# Patient Record
Sex: FEMALE | Race: WHITE | Employment: UNEMPLOYED | URBAN - METROPOLITAN AREA
[De-identification: names, ages, dates, MRNs, and addresses within clinical notes are randomized per-mention and may not be internally consistent; named-entity substitution may affect disease eponyms.]

---

## 2017-02-09 ENCOUNTER — HOSPITAL ENCOUNTER (OUTPATIENT)
Dept: SLEEP MEDICINE | Age: 47
Discharge: HOME | End: 2017-02-09
Payer: COMMERCIAL

## 2017-02-09 PROCEDURE — 95810 POLYSOM 6/> YRS 4/> PARAM: CPT

## 2017-02-23 ENCOUNTER — HOSPITAL ENCOUNTER (OUTPATIENT)
Dept: SLEEP MEDICINE | Age: 47
Discharge: HOME | End: 2017-02-23
Payer: COMMERCIAL

## 2017-02-23 PROCEDURE — 95811 POLYSOM 6/>YRS CPAP 4/> PARM: CPT

## 2017-09-06 PROBLEM — Z99.89 OSA ON CPAP: Status: ACTIVE | Noted: 2017-09-06

## 2017-09-06 PROBLEM — G47.33 OSA ON CPAP: Status: ACTIVE | Noted: 2017-09-06

## 2017-09-23 PROBLEM — G47.33 OSA ON CPAP: Chronic | Status: ACTIVE | Noted: 2017-09-06

## 2017-09-23 PROBLEM — Z99.89 OSA ON CPAP: Chronic | Status: ACTIVE | Noted: 2017-09-06

## 2017-10-05 ENCOUNTER — HOSPITAL ENCOUNTER (OUTPATIENT)
Dept: MAMMOGRAPHY | Age: 47
Discharge: HOME | End: 2017-10-05
Attending: FAMILY MEDICINE
Payer: COMMERCIAL

## 2017-10-05 DIAGNOSIS — N64.4 BREAST PAIN, LEFT: ICD-10-CM

## 2017-10-05 PROCEDURE — 77066 DX MAMMO INCL CAD BI: CPT

## 2017-11-04 PROBLEM — E66.9 CLASS 2 OBESITY IN ADULT: Status: ACTIVE | Noted: 2017-11-04

## 2018-04-04 PROBLEM — Z91.199 NO-SHOW FOR APPOINTMENT: Chronic | Status: ACTIVE | Noted: 2018-04-04

## 2018-09-19 ENCOUNTER — HOSPITAL ENCOUNTER (OUTPATIENT)
Dept: GENERAL RADIOLOGY | Age: 48
Discharge: HOME | End: 2018-09-19
Attending: FAMILY MEDICINE
Payer: COMMERCIAL

## 2018-09-19 PROCEDURE — 72110 X-RAY EXAM L-2 SPINE 4/>VWS: CPT

## 2018-12-14 PROBLEM — N18.30 CHRONIC KIDNEY DISEASE, STAGE III (MODERATE) (HCC): Chronic | Status: ACTIVE | Noted: 2018-12-14

## 2018-12-14 PROBLEM — N18.30 CHRONIC KIDNEY DISEASE, STAGE III (MODERATE) (HCC): Status: ACTIVE | Noted: 2018-12-14

## 2020-01-13 PROBLEM — Z00.00 PREVENTATIVE HEALTH CARE: Status: ACTIVE | Noted: 2020-01-13

## 2020-01-13 PROBLEM — E66.01 SEVERE OBESITY (HCC): Status: ACTIVE | Noted: 2020-01-13

## 2020-01-13 PROBLEM — F33.1 DEPRESSION, MAJOR, RECURRENT, MODERATE (HCC): Status: ACTIVE | Noted: 2020-01-13

## 2020-01-28 PROBLEM — E83.52 HYPERCALCEMIA: Status: ACTIVE | Noted: 2020-01-28

## 2020-01-28 PROBLEM — R79.89 ABNORMAL LFTS: Status: ACTIVE | Noted: 2020-01-28

## 2020-02-12 PROBLEM — Z00.00 PREVENTATIVE HEALTH CARE: Status: RESOLVED | Noted: 2020-01-13 | Resolved: 2020-02-12

## 2020-02-25 LAB
HBA1C MFR BLD HPLC: 9.7 %
HBA1C MFR BLD HPLC: 9.7 %

## 2020-03-12 ENCOUNTER — HOSPITAL ENCOUNTER (OUTPATIENT)
Dept: ULTRASOUND IMAGING | Age: 50
Discharge: HOME | End: 2020-03-12
Attending: INTERNAL MEDICINE
Payer: COMMERCIAL

## 2020-03-12 DIAGNOSIS — R79.89 ELEVATED LFTS: ICD-10-CM

## 2020-03-12 PROCEDURE — 76700 US EXAM ABDOM COMPLETE: CPT

## 2021-03-04 PROBLEM — Z12.4 CERVICAL CANCER SCREENING: Status: ACTIVE | Noted: 2021-03-04

## 2021-03-04 PROBLEM — Z12.11 COLON CANCER SCREENING: Status: ACTIVE | Noted: 2021-03-04

## 2021-03-04 PROBLEM — Z12.39 BREAST CANCER SCREENING: Status: ACTIVE | Noted: 2021-03-04

## 2021-05-06 PROBLEM — E83.52 HYPERCALCEMIA: Status: RESOLVED | Noted: 2020-01-28 | Resolved: 2021-05-06

## 2021-05-06 PROBLEM — R80.9 MICROALBUMINURIA DUE TO TYPE 2 DIABETES MELLITUS (HCC): Status: ACTIVE | Noted: 2021-05-06

## 2021-05-06 PROBLEM — E11.29 MICROALBUMINURIA DUE TO TYPE 2 DIABETES MELLITUS (HCC): Status: ACTIVE | Noted: 2021-05-06

## 2021-08-04 PROBLEM — M75.81 RIGHT ROTATOR CUFF TENDINITIS: Status: ACTIVE | Noted: 2021-08-04

## 2021-11-05 PROBLEM — N18.4 CKD (CHRONIC KIDNEY DISEASE) STAGE 4, GFR 15-29 ML/MIN (HCC): Status: ACTIVE | Noted: 2018-12-14

## 2021-11-05 PROBLEM — Z91.199 NO-SHOW FOR APPOINTMENT: Chronic | Status: RESOLVED | Noted: 2018-04-04 | Resolved: 2021-11-05

## 2021-11-05 PROBLEM — D64.9 CHRONIC ANEMIA: Status: ACTIVE | Noted: 2021-11-05

## 2022-03-10 PROBLEM — Z23 ENCOUNTER FOR IMMUNIZATION: Status: ACTIVE | Noted: 2022-03-10

## 2022-03-18 PROBLEM — Z12.4 CERVICAL CANCER SCREENING: Status: ACTIVE | Noted: 2021-03-04

## 2022-03-18 PROBLEM — M75.81 RIGHT ROTATOR CUFF TENDINITIS: Status: ACTIVE | Noted: 2021-08-04

## 2022-03-18 PROBLEM — Z12.39 BREAST CANCER SCREENING: Status: ACTIVE | Noted: 2021-03-04

## 2022-03-19 PROBLEM — Z99.89 OSA ON CPAP: Status: ACTIVE | Noted: 2017-09-06

## 2022-03-19 PROBLEM — R80.9 MICROALBUMINURIA DUE TO TYPE 2 DIABETES MELLITUS (HCC): Status: ACTIVE | Noted: 2021-05-06

## 2022-03-19 PROBLEM — D64.9 CHRONIC ANEMIA: Status: ACTIVE | Noted: 2021-11-05

## 2022-03-19 PROBLEM — R79.89 ABNORMAL LFTS: Status: ACTIVE | Noted: 2020-01-28

## 2022-03-19 PROBLEM — Z23 ENCOUNTER FOR IMMUNIZATION: Status: ACTIVE | Noted: 2022-03-10

## 2022-03-19 PROBLEM — G47.33 OSA ON CPAP: Status: ACTIVE | Noted: 2017-09-06

## 2022-03-19 PROBLEM — Z12.11 COLON CANCER SCREENING: Status: ACTIVE | Noted: 2021-03-04

## 2022-03-19 PROBLEM — N18.4 CKD (CHRONIC KIDNEY DISEASE) STAGE 4, GFR 15-29 ML/MIN (HCC): Status: ACTIVE | Noted: 2018-12-14

## 2022-03-19 PROBLEM — E11.29 MICROALBUMINURIA DUE TO TYPE 2 DIABETES MELLITUS (HCC): Status: ACTIVE | Noted: 2021-05-06

## 2022-03-20 PROBLEM — F33.1 DEPRESSION, MAJOR, RECURRENT, MODERATE (HCC): Status: ACTIVE | Noted: 2020-01-13

## 2022-04-09 PROBLEM — Z12.39 BREAST CANCER SCREENING: Status: RESOLVED | Noted: 2021-03-04 | Resolved: 2022-04-09

## 2022-04-27 PROBLEM — Z12.11 COLON CANCER SCREENING: Status: RESOLVED | Noted: 2021-03-04 | Resolved: 2022-04-27

## 2022-04-28 DIAGNOSIS — Z79.4 ENCOUNTER FOR LONG-TERM (CURRENT) INSULIN USE (HCC): ICD-10-CM

## 2022-04-28 DIAGNOSIS — E11.29 MICROALBUMINURIA DUE TO TYPE 2 DIABETES MELLITUS (HCC): Primary | ICD-10-CM

## 2022-04-28 DIAGNOSIS — N18.4 CKD (CHRONIC KIDNEY DISEASE) STAGE 4, GFR 15-29 ML/MIN (HCC): ICD-10-CM

## 2022-04-28 DIAGNOSIS — E03.9 HYPOTHYROIDISM, UNSPECIFIED TYPE: ICD-10-CM

## 2022-04-28 DIAGNOSIS — R80.9 MICROALBUMINURIA DUE TO TYPE 2 DIABETES MELLITUS (HCC): Primary | ICD-10-CM

## 2022-05-05 ENCOUNTER — HOSPITAL ENCOUNTER (OUTPATIENT)
Dept: MAMMOGRAPHY | Age: 52
Discharge: HOME | End: 2022-05-05
Attending: INTERNAL MEDICINE
Payer: COMMERCIAL

## 2022-05-05 DIAGNOSIS — Z12.31 ENCOUNTER FOR SCREENING MAMMOGRAM FOR MALIGNANT NEOPLASM OF BREAST: ICD-10-CM

## 2022-05-05 PROCEDURE — 77067 SCR MAMMO BI INCL CAD: CPT

## 2022-05-23 DIAGNOSIS — E78.5 HYPERLIPIDEMIA, UNSPECIFIED: ICD-10-CM

## 2022-05-23 RX ORDER — AMLODIPINE BESYLATE 10 MG/1
10 TABLET ORAL DAILY
Qty: 30 TABLET | Refills: 5 | Status: SHIPPED | OUTPATIENT
Start: 2022-05-23 | End: 2022-05-24

## 2022-05-23 RX ORDER — ATORVASTATIN CALCIUM 80 MG/1
80 TABLET, FILM COATED ORAL DAILY
Qty: 30 TABLET | Refills: 5 | Status: SHIPPED | OUTPATIENT
Start: 2022-05-23 | End: 2022-05-24

## 2022-05-23 RX ORDER — METOPROLOL SUCCINATE 50 MG/1
50 TABLET, EXTENDED RELEASE ORAL DAILY
Qty: 30 TABLET | Refills: 5 | Status: SHIPPED | OUTPATIENT
Start: 2022-05-23 | End: 2022-05-24

## 2022-05-23 NOTE — TELEPHONE ENCOUNTER
Requested Prescriptions     Pending Prescriptions Disp Refills    amLODIPine (NORVASC) 10 MG tablet 30 tablet 5     Sig: Take 1 tablet by mouth daily    atorvastatin (LIPITOR) 80 MG tablet 30 tablet 5     Sig: Take 1 tablet by mouth daily    metoprolol succinate (TOPROL XL) 50 MG extended release tablet 30 tablet 5     Sig: Take 1 tablet by mouth daily     Last ofv  03/10/22

## 2022-05-24 RX ORDER — AMLODIPINE BESYLATE 10 MG/1
TABLET ORAL
Qty: 30 TABLET | Refills: 5 | Status: SHIPPED | OUTPATIENT
Start: 2022-05-24

## 2022-05-24 RX ORDER — ATORVASTATIN CALCIUM 80 MG/1
TABLET, FILM COATED ORAL
Qty: 30 TABLET | Refills: 5 | Status: SHIPPED | OUTPATIENT
Start: 2022-05-24

## 2022-05-24 RX ORDER — METOPROLOL SUCCINATE 50 MG/1
TABLET, EXTENDED RELEASE ORAL
Qty: 30 TABLET | Refills: 5 | Status: SHIPPED | OUTPATIENT
Start: 2022-05-24

## 2022-05-25 RX ORDER — INSULIN GLARGINE 100 [IU]/ML
45 INJECTION, SOLUTION SUBCUTANEOUS NIGHTLY
Qty: 5 PEN | Refills: 5 | Status: SHIPPED | OUTPATIENT
Start: 2022-05-25

## 2022-05-25 RX ORDER — GABAPENTIN 100 MG/1
100 CAPSULE ORAL 3 TIMES DAILY
Qty: 270 CAPSULE | Refills: 1 | Status: SHIPPED | OUTPATIENT
Start: 2022-05-25 | End: 2022-11-04

## 2022-05-25 NOTE — TELEPHONE ENCOUNTER
Requested Prescriptions     Pending Prescriptions Disp Refills    insulin glargine (LANTUS SOLOSTAR) 100 UNIT/ML injection pen 5 pen 5     Sig: Inject 45 Units into the skin nightly INJECT 45 UNITS BY SUBCUTANEOUS ROUTE DAILY. CALL MD IF BLOOD SUGAR IS LESS THAN 70 OR MORE THAN 400INJECT 45 UNITS BY SUBCUTANEOUS ROUTE DAILY.  CALL MD IF BLOOD SUGAR IS LESS THAN 70 OR MORE THAN 400

## 2022-06-22 RX ORDER — LEVOTHYROXINE SODIUM 112 UG/1
112 TABLET ORAL
Qty: 90 TABLET | Refills: 1 | Status: SHIPPED | OUTPATIENT
Start: 2022-06-22

## 2022-06-22 NOTE — TELEPHONE ENCOUNTER
Requested Prescriptions     Pending Prescriptions Disp Refills    levothyroxine (SYNTHROID) 112 MCG tablet 90 tablet 1     Sig: Take 1 tablet by mouth every morning (before breakfast)

## 2022-06-30 DIAGNOSIS — E11.65 TYPE 2 DIABETES MELLITUS WITH HYPERGLYCEMIA (HCC): ICD-10-CM

## 2022-06-30 RX ORDER — INSULIN GLARGINE 100 [IU]/ML
INJECTION, SOLUTION SUBCUTANEOUS
Refills: 5 | OUTPATIENT
Start: 2022-06-30

## 2022-07-11 DIAGNOSIS — N18.4 CKD (CHRONIC KIDNEY DISEASE) STAGE 4, GFR 15-29 ML/MIN (HCC): ICD-10-CM

## 2022-07-11 DIAGNOSIS — E11.29 MICROALBUMINURIA DUE TO TYPE 2 DIABETES MELLITUS (HCC): ICD-10-CM

## 2022-07-11 DIAGNOSIS — E03.9 HYPOTHYROIDISM, UNSPECIFIED TYPE: ICD-10-CM

## 2022-07-11 DIAGNOSIS — R80.9 MICROALBUMINURIA DUE TO TYPE 2 DIABETES MELLITUS (HCC): ICD-10-CM

## 2022-07-11 DIAGNOSIS — Z79.4 ENCOUNTER FOR LONG-TERM (CURRENT) INSULIN USE (HCC): ICD-10-CM

## 2022-07-11 LAB
ALBUMIN SERPL-MCNC: 3.4 G/DL (ref 3.5–5)
ALBUMIN/GLOB SERPL: 0.8 {RATIO} (ref 1.2–3.5)
ALP SERPL-CCNC: 117 U/L (ref 50–136)
ALT SERPL-CCNC: 34 U/L (ref 12–65)
ANION GAP SERPL CALC-SCNC: 5 MMOL/L (ref 7–16)
AST SERPL-CCNC: 22 U/L (ref 15–37)
BILIRUB SERPL-MCNC: 0.3 MG/DL (ref 0.2–1.1)
BUN SERPL-MCNC: 37 MG/DL (ref 6–23)
CALCIUM SERPL-MCNC: 9.3 MG/DL (ref 8.3–10.4)
CHLORIDE SERPL-SCNC: 109 MMOL/L (ref 98–107)
CO2 SERPL-SCNC: 26 MMOL/L (ref 21–32)
CREAT SERPL-MCNC: 1.9 MG/DL (ref 0.6–1)
EST. AVERAGE GLUCOSE BLD GHB EST-MCNC: 166 MG/DL
GLOBULIN SER CALC-MCNC: 4.1 G/DL (ref 2.3–3.5)
GLUCOSE SERPL-MCNC: 59 MG/DL (ref 65–100)
HBA1C MFR BLD: 7.4 % (ref 4.8–5.6)
POTASSIUM SERPL-SCNC: 4.6 MMOL/L (ref 3.5–5.1)
PROT SERPL-MCNC: 7.5 G/DL (ref 6.3–8.2)
SODIUM SERPL-SCNC: 140 MMOL/L (ref 136–145)
TSH, 3RD GENERATION: 1.68 UIU/ML (ref 0.36–3.74)

## 2022-07-20 ENCOUNTER — OFFICE VISIT (OUTPATIENT)
Dept: INTERNAL MEDICINE CLINIC | Facility: CLINIC | Age: 52
End: 2022-07-20
Payer: COMMERCIAL

## 2022-07-20 VITALS
BODY MASS INDEX: 32.15 KG/M2 | TEMPERATURE: 98.6 F | DIASTOLIC BLOOD PRESSURE: 70 MMHG | HEIGHT: 67 IN | HEART RATE: 71 BPM | WEIGHT: 204.8 LBS | SYSTOLIC BLOOD PRESSURE: 138 MMHG | OXYGEN SATURATION: 98 %

## 2022-07-20 DIAGNOSIS — E11.29 MICROALBUMINURIA DUE TO TYPE 2 DIABETES MELLITUS (HCC): ICD-10-CM

## 2022-07-20 DIAGNOSIS — N18.4 CKD (CHRONIC KIDNEY DISEASE) STAGE 4, GFR 15-29 ML/MIN (HCC): ICD-10-CM

## 2022-07-20 DIAGNOSIS — I10 HYPERTENSION, ESSENTIAL: ICD-10-CM

## 2022-07-20 DIAGNOSIS — E78.5 HYPERLIPIDEMIA, UNSPECIFIED HYPERLIPIDEMIA TYPE: ICD-10-CM

## 2022-07-20 DIAGNOSIS — J30.1 SEASONAL ALLERGIC RHINITIS DUE TO POLLEN: ICD-10-CM

## 2022-07-20 DIAGNOSIS — F33.1 DEPRESSION, MAJOR, RECURRENT, MODERATE (HCC): ICD-10-CM

## 2022-07-20 DIAGNOSIS — D64.9 CHRONIC ANEMIA: ICD-10-CM

## 2022-07-20 DIAGNOSIS — E11.42 DIABETIC POLYNEUROPATHY ASSOCIATED WITH TYPE 2 DIABETES MELLITUS (HCC): ICD-10-CM

## 2022-07-20 DIAGNOSIS — N18.32 TYPE 2 DIABETES MELLITUS WITH STAGE 3B CHRONIC KIDNEY DISEASE, WITH LONG-TERM CURRENT USE OF INSULIN (HCC): ICD-10-CM

## 2022-07-20 DIAGNOSIS — Z79.4 TYPE 2 DIABETES MELLITUS WITH STAGE 3B CHRONIC KIDNEY DISEASE, WITH LONG-TERM CURRENT USE OF INSULIN (HCC): ICD-10-CM

## 2022-07-20 DIAGNOSIS — R80.9 MICROALBUMINURIA DUE TO TYPE 2 DIABETES MELLITUS (HCC): ICD-10-CM

## 2022-07-20 DIAGNOSIS — Z23 ENCOUNTER FOR IMMUNIZATION: ICD-10-CM

## 2022-07-20 DIAGNOSIS — E11.22 TYPE 2 DIABETES MELLITUS WITH STAGE 3B CHRONIC KIDNEY DISEASE, WITH LONG-TERM CURRENT USE OF INSULIN (HCC): ICD-10-CM

## 2022-07-20 DIAGNOSIS — K21.9 GASTROESOPHAGEAL REFLUX DISEASE, UNSPECIFIED WHETHER ESOPHAGITIS PRESENT: ICD-10-CM

## 2022-07-20 PROBLEM — M75.81 RIGHT ROTATOR CUFF TENDINITIS: Status: RESOLVED | Noted: 2021-08-04 | Resolved: 2022-07-20

## 2022-07-20 PROBLEM — M75.81 RIGHT ROTATOR CUFF TENDINITIS: Status: ACTIVE | Noted: 2021-08-04

## 2022-07-20 PROCEDURE — 3051F HG A1C>EQUAL 7.0%<8.0%: CPT | Performed by: INTERNAL MEDICINE

## 2022-07-20 PROCEDURE — 99214 OFFICE O/P EST MOD 30 MIN: CPT | Performed by: INTERNAL MEDICINE

## 2022-07-20 RX ORDER — SERTRALINE HYDROCHLORIDE 25 MG/1
25 TABLET, FILM COATED ORAL DAILY
Qty: 90 TABLET | Refills: 1 | Status: SHIPPED | OUTPATIENT
Start: 2022-07-20

## 2022-07-20 ASSESSMENT — ENCOUNTER SYMPTOMS
VOICE CHANGE: 0
RECTAL PAIN: 0
EYE PAIN: 0
STRIDOR: 0

## 2022-07-20 NOTE — PROGRESS NOTES
FOLLOWUP VISIT    Subjective:    Ms. Leanne Mckinley is a 45 y.o., female,   Chief Complaint   Patient presents with    Follow-up Chronic Condition       HPI:    Patient presents today for follow up of two or more chronic medical problems and review of labs. The patient has diabetes mellitus. The patient reports good blood sugar readings at home. Denies any symptoms of hypo or hyperglycemia. The patient has been attempting to be compliant with an ADA diet and an exercise program.     The patient has hypertension. The patient has been on an attempted low sodium diet and has been trying to exercise and maintain a healthy weight. The patient reports good compliance with the blood pressure medications and good blood pressure readings on home / ambulatory monitoring. The patient has hyperlipidemia. The patient has been following a low cholesterol diet and denies any myalgias or weakness on current lipid lowering therapy.        The following portions of the patient's history were reviewed and updated as appropriate:      Past Medical History:   Diagnosis Date    Allergic rhinitis     CKD (chronic kidney disease), stage III ([address]     Depression     Diabetes mellitus, type 2 ([address]     Diabetic neuropathy ([address]     Hyperlipidemia associated with type 2 diabetes mellitus ([address]     Hypertension     Hypothyroidism     Obesity     RIDDHI (obstructive sleep apnea)        Past Surgical History:   Procedure Laterality Date    CHOLECYSTECTOMY  1997    TUBAL LIGATION         Family History   Problem Relation Age of Onset    Hypertension Mother     Depression Mother     COPD Mother     Cancer Brother         skin cancer    Heart Failure Brother     Cancer Father         skin     Breast Cancer Neg Hx     Alzheimer's Disease Maternal Grandmother     Heart Disease Brother     Heart Failure Father     COPD Father     Heart Failure Paternal Grandfather     Diabetes Paternal Grandfather     Emphysema Maternal Grandfather        Social History     Socioeconomic History    Marital status:      Spouse name: Not on file    Number of children: Not on file    Years of education: Not on file    Highest education level: Not on file   Occupational History    Not on file   Tobacco Use    Smoking status: Never    Smokeless tobacco: Never   Substance and Sexual Activity    Alcohol use: No    Drug use: No    Sexual activity: Not on file   Other Topics Concern    Not on file   Social History Narrative    GYN: 4/5/2022  Abnormal Pap Smears: no  Cervical Procedures: no  Menarche: ~48     Social Determinants of Health     Financial Resource Strain: Not on file   Food Insecurity: Not on file   Transportation Needs: Not on file   Physical Activity: Not on file   Stress: Not on file   Social Connections: Not on file   Intimate Partner Violence: Not on file   Housing Stability: Not on file       Current Outpatient Medications   Medication Sig Dispense Refill    levothyroxine (SYNTHROID) 112 MCG tablet Take 1 tablet by mouth every morning (before breakfast) 90 tablet 1    gabapentin (NEURONTIN) 100 MG capsule Take 1 capsule by mouth 3 times daily for 180 days. 270 capsule 1    insulin glargine (LANTUS SOLOSTAR) 100 UNIT/ML injection pen Inject 45 Units into the skin nightly INJECT 45 UNITS BY SUBCUTANEOUS ROUTE DAILY. CALL MD IF BLOOD SUGAR IS LESS THAN 70 OR MORE THAN 400INJECT 45 UNITS BY SUBCUTANEOUS ROUTE DAILY.  CALL MD IF BLOOD SUGAR IS LESS THAN 70 OR MORE THAN 400 5 pen 5    atorvastatin (LIPITOR) 80 MG tablet TAKE 1 TABLET BY MOUTH EVERY DAY 30 tablet 5    amLODIPine (NORVASC) 10 MG tablet TAKE 1 TABLET BY MOUTH EVERY DAY 30 tablet 5    metoprolol succinate (TOPROL XL) 50 MG extended release tablet TAKE 1 TABLET BY MOUTH EVERY DAY 30 tablet 5    empagliflozin (JARDIANCE) 10 MG tablet Take 10 mg by mouth daily      sertraline (ZOLOFT) 25 MG tablet Take 25 mg by mouth daily      insulin aspart (NOVOLOG) 100 UNIT/ML injection pen Inject 19 Units into the skin 3 times daily (before meals)       No current facility-administered medications for this visit. Allergies as of 07/20/2022    (Not on File)       Review of Systems   Constitutional:  Negative for activity change and appetite change. HENT:  Negative for drooling and voice change. Eyes:  Negative for pain. Respiratory:  Negative for stridor. Gastrointestinal:  Negative for rectal pain. Endocrine: Negative for polydipsia and polyphagia. Genitourinary:  Negative for dyspareunia and enuresis. Musculoskeletal:  Negative for gait problem and neck stiffness. Skin:  Negative for pallor. Neurological:  Negative for facial asymmetry and speech difficulty. Hematological:  Does not bruise/bleed easily. Psychiatric/Behavioral:  Negative for self-injury. The patient is not hyperactive. Patient Care Team:  Fan Adorno MD as PCP - General  Fan Adorno MD as PCP - [facility] Empaneled Provider    Objective:    /70 (Site: Left Upper Arm, Position: Sitting)   Pulse 71   Temp 98.6 °F (37 °C) (Temporal)   Ht 5' 7\" (1.702 m)   Wt 204 lb 12.8 oz (92.9 kg)   SpO2 98%   BMI 32.08 kg/m²     Physical Exam  Vitals reviewed. Constitutional:       General: She is not in acute distress. Appearance: She is not toxic-appearing. HENT:      Head: Normocephalic and atraumatic. Right Ear: Tympanic membrane, ear canal and external ear normal.      Left Ear: Tympanic membrane, ear canal and external ear normal.      Nose: Nose normal.      Mouth/Throat:      Mouth: Mucous membranes are moist.      Pharynx: Oropharynx is clear. Eyes:      General: No scleral icterus. Extraocular Movements: Extraocular movements intact. Pupils: Pupils are equal, round, and reactive to light. Cardiovascular:      Rate and Rhythm: Normal rate and regular rhythm. Pulses: Normal pulses. Heart sounds: Normal heart sounds.    Pulmonary:      Effort: Pulmonary effort is normal. No respiratory distress. Breath sounds: Normal breath sounds. No stridor. Abdominal:      General: Abdomen is flat. Bowel sounds are normal.      Palpations: Abdomen is soft. There is no mass. Tenderness: There is no guarding or rebound. Musculoskeletal:         General: Normal range of motion. Cervical back: Normal range of motion and neck supple. Skin:     General: Skin is warm and dry. Coloration: Skin is not jaundiced. Neurological:      Mental Status: She is alert and oriented to person, place, and time. Mental status is at baseline. Comments: Diabetic foot exam:   Left Foot:   Visual Exam: normal   Pulse DP: 2+ (normal)   Filament test: reduced sensation     Right Foot:   Visual Exam: normal   Pulse DP: 2+ (normal)   Filament test: reduced sensation       Psychiatric:         Behavior: Behavior normal.         Thought Content:  Thought content normal.            Orders Only on 07/11/2022   Component Date Value Ref Range Status    TSH, 3RD GENERATION 07/11/2022 1.680  0.358 - 3.740 uIU/mL Final    Hemoglobin A1C 07/11/2022 7.4 (A) 4.8 - 5.6 % Final    eAG 07/11/2022 166  mg/dL Final    Comment: Reference Range  Normal: 4.8-5.6  Diabetic >=6.5%  Normal       <5.7%      Sodium 07/11/2022 140  136 - 145 mmol/L Final    Potassium 07/11/2022 4.6  3.5 - 5.1 mmol/L Final    Chloride 07/11/2022 109 (A) 98 - 107 mmol/L Final    CO2 07/11/2022 26  21 - 32 mmol/L Final    Anion Gap 07/11/2022 5 (A) 7 - 16 mmol/L Final    Glucose 07/11/2022 59 (A) 65 - 100 mg/dL Final    BUN 07/11/2022 37 (A) 6 - 23 MG/DL Final    Creatinine 07/11/2022 1.90 (A) 0.6 - 1.0 MG/DL Final    GFR  07/11/2022 35 (A) >60 ml/min/1.73m2 Final    GFR Non- 07/11/2022 29 (A) >60 ml/min/1.73m2 Final    Comment:   Estimated GFR is calculated using the Modification of Diet in Renal Disease (MDRD) Study equation, reported for both  Americans (GFRAA) and non- Americans (GFRNA), and normalized to 1.73m2 body surface area. The physician must decide which value applies to the patient. The MDRD study equation should only be used in individuals age 25 or older. It has not been validated for the following: pregnant women, patients with serious comorbid conditions,or on certain medications, or persons with extremes of body size, muscle mass, or nutritional status. Calcium 07/11/2022 9.3  8.3 - 10.4 MG/DL Final    Total Bilirubin 07/11/2022 0.3  0.2 - 1.1 MG/DL Final    ALT 07/11/2022 34  12 - 65 U/L Final    AST 07/11/2022 22  15 - 37 U/L Final    Alk Phosphatase 07/11/2022 117  50 - 136 U/L Final    Total Protein 07/11/2022 7.5  6.3 - 8.2 g/dL Final    Albumin 07/11/2022 3.4 (A) 3.5 - 5.0 g/dL Final    Globulin 07/11/2022 4.1 (A) 2.3 - 3.5 g/dL Final    Albumin/Globulin Ratio 07/11/2022 0.8 (A) 1.2 - 3.5   Final         Assessent & Plan:        1. Microalbuminuria due to type 2 diabetes mellitus (HCC)  Overview:  Lisinopril 20 mg daily was discontinued during October 2021 hospitalization for septic shock with acute kidney failure requiring dialysis and lactic acidosis. Continue current antihypertensive and diabetes care. 2. Hypertension, essential  Overview:  Well controlled on current amlodipine and metoprolol. Lisinopril was stopped while hospitalized with acute renal failure / septic shock. 3. Hyperlipidemia, unspecified hyperlipidemia type  Overview:  Reviewed the most recent lipid panel with the patient, and interpreted the results within the context of the patient's personal cardiovascular risk factors. Discussed/reinforced a low-cholesterol diet. The patient will continue current intensity statin therapy. The patient will continue atorvastatin 80 mg daily. Orders:  -     Comprehensive Metabolic Panel; Future  -     Lipid Panel; Future  4. Gastroesophageal reflux disease, unspecified whether esophagitis present  Overview:  Controlled with nonpharmacologic therapy.   Previously treated with omeprazole. 5. Encounter for immunization  Overview:  Vaccinations discussed. She declines Covid vaccination. Recommended Shingrix. 6. Diabetic polyneuropathy associated with type 2 diabetes mellitus ([facility]  Overview:  Symptoms relieved by gabapentin. The patient will continue the current treatment. Routine diabetic foot care. 7. Type 2 diabetes mellitus with stage 3b chronic kidney disease, with long-term current use of insulin ([facility])  Overview:  7/11/22 A1C 7.4  3/2/22 A1C 6.6, urine microalbumin 61 on Basaglar 45 U daily + Humalog 19 U TID WM   7/28/21 , A1C 10.2 (on Rybelsus 7 mg and Basaglar 69 units daily and metformin 1000 bid)  4/30/21 , A1C 9.7, urine microalbumin 438. The patient has had historically poorly controlled type II insulin requiring diabetes mellitus complicated by diabetic neuropathy and nephropathy. Rybelsus and Metformin were discontinued during October 2021 hospitalization for septic shock with acute kidney failure and lactic acidosis requiring temporary hemodialysis. Her current A1C is markedly better on current insulin therapy. The patient will continue the current treatment. Orders:  -     Hemoglobin A1C; Future  8. Depression, major, recurrent, moderate ([facility]  Overview:  Symptoms controlled on sertraline. The patient will continue the current treatment. Orders:  -     sertraline (ZOLOFT) 25 MG tablet; Take 1 tablet by mouth in the morning., Disp-90 tablet, R-1Normal  9. CKD (chronic kidney disease) stage 4, GFR 15-29 ml/min ([facility])  Overview:  7/11/22 creatinine 1.9, eGFR 29  3/2/22 creatinine 1.94, eGFR 30, urine microalbumin 61  10/27/21 creatinine 2.32, eGFR 23  4/30/21 creatinine 1.46, eGFR 47, urine microalbumin 438.    3/12/20 abdominal US - normal kidneys. The patient had chronic stage IIIb / IV CKD with microalbuminuria.   During a October 2021 hospitalization for septic shock with acute renal failure and lactic acidosis requiring temporary hemodialysis her lisinopril 20 mg daily was discontinued. Her renal function has partially recovered. She will no longer receive Metformin. She will follow up with [place] [facility] as scheduled. If her renal function recovers sufficiently she might at some later date be resumed on ACE inhibitor or ARB therapy due to the microalbuminuria. 10. Chronic anemia  Overview:  3/2/22 CBC normal.  12/22/21 hgb 10.9  10/23/21 B12 575, folate 14, ferritin 449, iron saturation > 50    The patient has had chronic anemia most likely due to chronic disease and/or chronic kidney disease. Defer management of anemia due to kidney disease to nephrology. 11. Seasonal allergic rhinitis due to pollen  Overview:  Symptoms relieved by claritin PRN. The patient will continue the current treatment. The patient and/or patient representative voiced understanding and agreement with the current diagnoses, recommendations, and possible side effects. Return in about 3 months (around 10/20/2022) for follow up of chronic medical problems, review labs.

## 2022-08-15 RX ORDER — INSULIN ASPART 100 [IU]/ML
INJECTION, SOLUTION INTRAVENOUS; SUBCUTANEOUS
Refills: 5 | OUTPATIENT
Start: 2022-08-15

## 2022-08-18 RX ORDER — INSULIN ASPART 100 [IU]/ML
INJECTION, SOLUTION INTRAVENOUS; SUBCUTANEOUS
COMMUNITY
End: 2022-08-18 | Stop reason: DRUGHIGH

## 2022-08-18 RX ORDER — INSULIN ASPART 100 [IU]/ML
INJECTION, SOLUTION INTRAVENOUS; SUBCUTANEOUS
Qty: 5 PEN | Status: SHIPPED | COMMUNITY
Start: 2022-08-18 | End: 2022-08-18 | Stop reason: SDUPTHER

## 2022-08-18 RX ORDER — INSULIN ASPART 100 [IU]/ML
INJECTION, SOLUTION INTRAVENOUS; SUBCUTANEOUS
Qty: 5 PEN | Refills: 2 | Status: SHIPPED | OUTPATIENT
Start: 2022-08-18

## 2022-08-18 NOTE — TELEPHONE ENCOUNTER
Pt requests new Rx Novolog injection sent to [facility] (NOT in [employer]) she tried to request this through [address] but it was not listed

## 2022-10-27 ENCOUNTER — OFFICE VISIT (OUTPATIENT)
Dept: INTERNAL MEDICINE CLINIC | Facility: CLINIC | Age: 52
End: 2022-10-27
Payer: COMMERCIAL

## 2022-10-27 VITALS
HEIGHT: 67 IN | BODY MASS INDEX: 32.96 KG/M2 | DIASTOLIC BLOOD PRESSURE: 74 MMHG | HEART RATE: 89 BPM | TEMPERATURE: 97 F | SYSTOLIC BLOOD PRESSURE: 137 MMHG | WEIGHT: 210 LBS | OXYGEN SATURATION: 99 %

## 2022-10-27 DIAGNOSIS — M54.41 ACUTE RIGHT-SIDED LOW BACK PAIN WITH RIGHT-SIDED SCIATICA: ICD-10-CM

## 2022-10-27 PROCEDURE — 3074F SYST BP LT 130 MM HG: CPT | Performed by: INTERNAL MEDICINE

## 2022-10-27 PROCEDURE — 99213 OFFICE O/P EST LOW 20 MIN: CPT | Performed by: INTERNAL MEDICINE

## 2022-10-27 PROCEDURE — 3078F DIAST BP <80 MM HG: CPT | Performed by: INTERNAL MEDICINE

## 2022-10-27 RX ORDER — PEN NEEDLE, DIABETIC 32GX 5/32"
NEEDLE, DISPOSABLE MISCELLANEOUS
COMMUNITY
Start: 2022-09-05

## 2022-10-27 RX ORDER — TIZANIDINE 2 MG/1
2 TABLET ORAL 3 TIMES DAILY PRN
Qty: 30 TABLET | Refills: 0 | Status: SHIPPED | OUTPATIENT
Start: 2022-10-27 | End: 2022-11-22

## 2022-10-27 RX ORDER — LISINOPRIL 10 MG/1
10 TABLET ORAL DAILY
COMMUNITY
Start: 2022-09-16 | End: 2022-10-27

## 2022-10-27 RX ORDER — METHYLPREDNISOLONE 4 MG/1
TABLET ORAL
Qty: 1 KIT | Refills: 0 | Status: SHIPPED | OUTPATIENT
Start: 2022-10-27 | End: 2022-11-02

## 2022-10-27 RX ORDER — INSULIN LISPRO 100 [IU]/ML
5 INJECTION, SOLUTION INTRAVENOUS; SUBCUTANEOUS
COMMUNITY
Start: 2021-10-27 | End: 2022-10-27

## 2022-10-27 ASSESSMENT — ENCOUNTER SYMPTOMS
RECTAL PAIN: 0
VOICE CHANGE: 0
EYE PAIN: 0
STRIDOR: 0

## 2022-10-27 NOTE — PROGRESS NOTES
FOLLOWUP VISIT    Subjective:    Ms. Selina Jones is a 45 y.o., female,   Chief Complaint   Patient presents with    Pain       HPI:    This patient states she was at a laundromat a week or so ago and developed acute right-sided low back pain which radiates down her right lateral thigh into her calf while lifting wet laundry up out of a top loading washer. She denies any weakness or sensory loss. Denies any bowel or bladder symptoms. Due to her preexistent chronic kidney disease nonsteroidal anti-inflammatories are relatively contraindicated. She is also currently living and temporary housing with relatives in [place] [address] which is about an hour away and it would not be very easy for her to attend physical therapy.     The following portions of the patient's history were reviewed and updated as appropriate:      Past Medical History:   Diagnosis Date    Allergic rhinitis     CKD (chronic kidney disease), stage III ([facility]     Depression     Diabetes mellitus, type 2 ([facility]     Diabetic neuropathy ([facility]     Hyperlipidemia associated with type 2 diabetes mellitus ([facility]     Hypertension     Hypothyroidism     Obesity     RIDDHI (obstructive sleep apnea)        Past Surgical History:   Procedure Laterality Date    CHOLECYSTECTOMY  1997    TUBAL LIGATION         Family History   Problem Relation Age of Onset    Hypertension Mother     Depression Mother     COPD Mother     Cancer Brother         skin cancer    Heart Failure Brother     Cancer Father         skin     Breast Cancer Neg Hx     Alzheimer's Disease Maternal Grandmother     Heart Disease Brother     Heart Failure Father     COPD Father     Heart Failure Paternal Grandfather     Diabetes Paternal Grandfather     Emphysema Maternal Grandfather        Social History     Socioeconomic History    Marital status:      Spouse name: Not on file    Number of children: Not on file    Years of education: Not on file    Highest education level: Not on file Occupational History    Not on file   Tobacco Use    Smoking status: Never    Smokeless tobacco: Never   Substance and Sexual Activity    Alcohol use: No    Drug use: No    Sexual activity: Not on file   Other Topics Concern    Not on file   Social History Narrative    GYN: 4/5/2022  Abnormal Pap Smears: no  Cervical Procedures: no  Menarche: ~48     Social Determinants of Health     Financial Resource Strain: Not on file   Food Insecurity: Not on file   Transportation Needs: Not on file   Physical Activity: Not on file   Stress: Not on file   Social Connections: Not on file   Intimate Partner Violence: Not on file   Housing Stability: Not on file       Current Outpatient Medications   Medication Sig Dispense Refill    BD PEN NEEDLE ANSIR 2ND GEN 32G X 4 MM MISC USE AS DIRECTED      methylPREDNISolone (MEDROL DOSEPACK) 4 MG tablet Take by mouth. 1 kit 0    tiZANidine (ZANAFLEX) 2 MG tablet Take 1 tablet by mouth 3 times daily as needed (muscle spasm) 30 tablet 0    insulin aspart (NOVOLOG FLEXPEN) 100 UNIT/ML injection pen 19 Units by SubCUTAneous route Before breakfast, lunch, and dinner 5 pen 2    sertraline (ZOLOFT) 25 MG tablet Take 1 tablet by mouth in the morning. 90 tablet 1    levothyroxine (SYNTHROID) 112 MCG tablet Take 1 tablet by mouth every morning (before breakfast) 90 tablet 1    gabapentin (NEURONTIN) 100 MG capsule Take 1 capsule by mouth 3 times daily for 180 days. 270 capsule 1    insulin glargine (LANTUS SOLOSTAR) 100 UNIT/ML injection pen Inject 45 Units into the skin nightly INJECT 45 UNITS BY SUBCUTANEOUS ROUTE DAILY. CALL MD IF BLOOD SUGAR IS LESS THAN 70 OR MORE THAN 400INJECT 45 UNITS BY SUBCUTANEOUS ROUTE DAILY.  CALL MD IF BLOOD SUGAR IS LESS THAN 70 OR MORE THAN 400 5 pen 5    atorvastatin (LIPITOR) 80 MG tablet TAKE 1 TABLET BY MOUTH EVERY DAY 30 tablet 5    amLODIPine (NORVASC) 10 MG tablet TAKE 1 TABLET BY MOUTH EVERY DAY 30 tablet 5    metoprolol succinate (TOPROL XL) 50 MG extended release tablet TAKE 1 TABLET BY MOUTH EVERY DAY 30 tablet 5    empagliflozin (JARDIANCE) 10 MG tablet Take 10 mg by mouth daily       No current facility-administered medications for this visit. Allergies as of 10/27/2022    (Not on File)       Review of Systems   Constitutional:  Negative for activity change and appetite change. HENT:  Negative for drooling and voice change. Eyes:  Negative for pain. Respiratory:  Negative for stridor. Gastrointestinal:  Negative for rectal pain. Endocrine: Negative for polydipsia and polyphagia. Genitourinary:  Negative for dyspareunia and enuresis. Musculoskeletal:  Negative for gait problem and neck stiffness. Skin:  Negative for pallor. Neurological:  Negative for facial asymmetry and speech difficulty. Hematological:  Does not bruise/bleed easily. Psychiatric/Behavioral:  Negative for self-injury. The patient is not hyperactive. Patient Care Team:  Noemi Vang MD as PCP - General  Noemi Vang MD as PCP - [facility] Empaneled Provider    Objective:    /74 (Site: Left Upper Arm, Position: Sitting)   Pulse 89   Temp 97 °F (36.1 °C) (Temporal)   Ht 5' 7\" (1.702 m)   Wt 210 lb (95.3 kg)   SpO2 99%   BMI 32.89 kg/m²     Physical Exam  Constitutional:       General: She is not in acute distress. Appearance: She is not toxic-appearing. HENT:      Head: Normocephalic and atraumatic. Nose: Nose normal.   Eyes:      Extraocular Movements: Extraocular movements intact. Conjunctiva/sclera: Conjunctivae normal.   Pulmonary:      Effort: Pulmonary effort is normal. No respiratory distress. Breath sounds: No stridor. Musculoskeletal:      Comments: The patient does not have any midline bony vertebral tenderness. She does have pain with palpation of her right lower lumbar paraspinal muscles. Straight leg raising is negative.   Motor reveals 5 out of 5 strength with normal tone and bulk in all motor groups of both lower extremities. Deep tendon reflexes are present and symmetric at both knees. I could not get an ankle jerk on either side. There is no subjective sensory loss. Skin:     Coloration: Skin is not jaundiced or pale. Neurological:      Mental Status: She is alert and oriented to person, place, and time. Psychiatric:         Thought Content: Thought content normal.            Orders Only on 07/11/2022   Component Date Value Ref Range Status    TSH, 3RD GENERATION 07/11/2022 1.680  0.358 - 3.740 uIU/mL Final    Hemoglobin A1C 07/11/2022 7.4 (A)  4.8 - 5.6 % Final    eAG 07/11/2022 166  mg/dL Final    Comment: Reference Range  Normal: 4.8-5.6  Diabetic >=6.5%  Normal       <5.7%      Sodium 07/11/2022 140  136 - 145 mmol/L Final    Potassium 07/11/2022 4.6  3.5 - 5.1 mmol/L Final    Chloride 07/11/2022 109 (A)  98 - 107 mmol/L Final    CO2 07/11/2022 26  21 - 32 mmol/L Final    Anion Gap 07/11/2022 5 (A)  7 - 16 mmol/L Final    Glucose 07/11/2022 59 (A)  65 - 100 mg/dL Final    BUN 07/11/2022 37 (A)  6 - 23 MG/DL Final    Creatinine 07/11/2022 1.90 (A)  0.6 - 1.0 MG/DL Final    GFR  07/11/2022 35 (A)  >60 ml/min/1.73m2 Final    GFR Non- 07/11/2022 29 (A)  >60 ml/min/1.73m2 Final    Comment:   Estimated GFR is calculated using the Modification of Diet in Renal Disease (MDRD) Study equation, reported for both  Americans (GFRAA) and non- Americans (GFRNA), and normalized to 1.73m2 body surface area. The physician must decide which value applies to the patient. The MDRD study equation should only be used in individuals age 25 or older. It has not been validated for the following: pregnant women, patients with serious comorbid conditions,or on certain medications, or persons with extremes of body size, muscle mass, or nutritional status.       Calcium 07/11/2022 9.3  8.3 - 10.4 MG/DL Final    Total Bilirubin 07/11/2022 0.3  0.2 - 1.1 MG/DL Final    ALT 07/11/2022 34  12 - 65

## 2022-11-03 DIAGNOSIS — E11.22 TYPE 2 DIABETES MELLITUS WITH STAGE 3B CHRONIC KIDNEY DISEASE, WITH LONG-TERM CURRENT USE OF INSULIN (HCC): ICD-10-CM

## 2022-11-03 DIAGNOSIS — Z79.4 TYPE 2 DIABETES MELLITUS WITH STAGE 3B CHRONIC KIDNEY DISEASE, WITH LONG-TERM CURRENT USE OF INSULIN (HCC): ICD-10-CM

## 2022-11-03 DIAGNOSIS — E78.5 HYPERLIPIDEMIA, UNSPECIFIED HYPERLIPIDEMIA TYPE: ICD-10-CM

## 2022-11-03 DIAGNOSIS — N18.32 TYPE 2 DIABETES MELLITUS WITH STAGE 3B CHRONIC KIDNEY DISEASE, WITH LONG-TERM CURRENT USE OF INSULIN (HCC): ICD-10-CM

## 2022-11-03 LAB
ALBUMIN SERPL-MCNC: 3.7 G/DL (ref 3.5–5)
ALBUMIN/GLOB SERPL: 1 {RATIO} (ref 0.4–1.6)
ALP SERPL-CCNC: 97 U/L (ref 50–136)
ALT SERPL-CCNC: 20 U/L (ref 12–65)
ANION GAP SERPL CALC-SCNC: 4 MMOL/L (ref 2–11)
AST SERPL-CCNC: 11 U/L (ref 15–37)
BILIRUB SERPL-MCNC: 0.3 MG/DL (ref 0.2–1.1)
BUN SERPL-MCNC: 37 MG/DL (ref 6–23)
CALCIUM SERPL-MCNC: 9.2 MG/DL (ref 8.3–10.4)
CHLORIDE SERPL-SCNC: 108 MMOL/L (ref 101–110)
CHOLEST SERPL-MCNC: 181 MG/DL
CO2 SERPL-SCNC: 29 MMOL/L (ref 21–32)
CREAT SERPL-MCNC: 1.8 MG/DL (ref 0.6–1)
GLOBULIN SER CALC-MCNC: 3.7 G/DL (ref 2.8–4.5)
GLUCOSE SERPL-MCNC: 118 MG/DL (ref 65–100)
HDLC SERPL-MCNC: 65 MG/DL (ref 40–60)
HDLC SERPL: 2.8 {RATIO}
LDLC SERPL CALC-MCNC: 94.4 MG/DL
POTASSIUM SERPL-SCNC: 4.8 MMOL/L (ref 3.5–5.1)
PROT SERPL-MCNC: 7.4 G/DL (ref 6.3–8.2)
SODIUM SERPL-SCNC: 141 MMOL/L (ref 133–143)
TRIGL SERPL-MCNC: 108 MG/DL (ref 35–150)
VLDLC SERPL CALC-MCNC: 21.6 MG/DL (ref 6–23)

## 2022-11-04 LAB
EST. AVERAGE GLUCOSE BLD GHB EST-MCNC: 174 MG/DL
HBA1C MFR BLD: 7.7 % (ref 4.8–5.6)

## 2022-11-04 RX ORDER — GABAPENTIN 100 MG/1
CAPSULE ORAL
Qty: 270 CAPSULE | Refills: 1 | Status: SHIPPED | OUTPATIENT
Start: 2022-11-04 | End: 2023-05-03

## 2022-11-04 NOTE — TELEPHONE ENCOUNTER
Requested Prescriptions     Pending Prescriptions Disp Refills    gabapentin (NEURONTIN) 100 MG capsule [Pharmacy Med Name: GABAPENTIN 100 MG CAPSULE] 270 capsule 1     Sig: TAKE 1 CAPSULE BY MOUTH 3 TIMES DAILY

## 2022-11-07 ENCOUNTER — OFFICE VISIT (OUTPATIENT)
Dept: INTERNAL MEDICINE CLINIC | Facility: CLINIC | Age: 52
End: 2022-11-07
Payer: COMMERCIAL

## 2022-11-07 VITALS
WEIGHT: 210 LBS | HEART RATE: 74 BPM | BODY MASS INDEX: 32.96 KG/M2 | HEIGHT: 67 IN | DIASTOLIC BLOOD PRESSURE: 69 MMHG | SYSTOLIC BLOOD PRESSURE: 129 MMHG | OXYGEN SATURATION: 99 % | TEMPERATURE: 98.5 F

## 2022-11-07 DIAGNOSIS — F33.1 DEPRESSION, MAJOR, RECURRENT, MODERATE (HCC): ICD-10-CM

## 2022-11-07 DIAGNOSIS — K21.9 GASTROESOPHAGEAL REFLUX DISEASE, UNSPECIFIED WHETHER ESOPHAGITIS PRESENT: ICD-10-CM

## 2022-11-07 DIAGNOSIS — Z12.31 ENCOUNTER FOR SCREENING MAMMOGRAM FOR MALIGNANT NEOPLASM OF BREAST: ICD-10-CM

## 2022-11-07 DIAGNOSIS — D64.9 CHRONIC ANEMIA: ICD-10-CM

## 2022-11-07 DIAGNOSIS — Z12.4 CERVICAL CANCER SCREENING: ICD-10-CM

## 2022-11-07 DIAGNOSIS — N18.4 CKD (CHRONIC KIDNEY DISEASE) STAGE 4, GFR 15-29 ML/MIN (HCC): ICD-10-CM

## 2022-11-07 DIAGNOSIS — E03.9 HYPOTHYROIDISM (ACQUIRED): ICD-10-CM

## 2022-11-07 DIAGNOSIS — G47.33 OSA ON CPAP: ICD-10-CM

## 2022-11-07 DIAGNOSIS — R79.89 ABNORMAL LFTS: ICD-10-CM

## 2022-11-07 DIAGNOSIS — Z12.11 COLON CANCER SCREENING: ICD-10-CM

## 2022-11-07 DIAGNOSIS — Z79.4 TYPE 2 DIABETES MELLITUS WITH STAGE 3B CHRONIC KIDNEY DISEASE, WITH LONG-TERM CURRENT USE OF INSULIN (HCC): ICD-10-CM

## 2022-11-07 DIAGNOSIS — Z23 ENCOUNTER FOR IMMUNIZATION: ICD-10-CM

## 2022-11-07 DIAGNOSIS — R80.9 MICROALBUMINURIA DUE TO TYPE 2 DIABETES MELLITUS (HCC): ICD-10-CM

## 2022-11-07 DIAGNOSIS — E11.22 TYPE 2 DIABETES MELLITUS WITH STAGE 3B CHRONIC KIDNEY DISEASE, WITH LONG-TERM CURRENT USE OF INSULIN (HCC): ICD-10-CM

## 2022-11-07 DIAGNOSIS — E11.69 HYPERLIPIDEMIA ASSOCIATED WITH TYPE 2 DIABETES MELLITUS (HCC): ICD-10-CM

## 2022-11-07 DIAGNOSIS — E78.5 HYPERLIPIDEMIA ASSOCIATED WITH TYPE 2 DIABETES MELLITUS (HCC): ICD-10-CM

## 2022-11-07 DIAGNOSIS — N18.32 TYPE 2 DIABETES MELLITUS WITH STAGE 3B CHRONIC KIDNEY DISEASE, WITH LONG-TERM CURRENT USE OF INSULIN (HCC): ICD-10-CM

## 2022-11-07 DIAGNOSIS — I10 HYPERTENSION, ESSENTIAL: Primary | ICD-10-CM

## 2022-11-07 DIAGNOSIS — E11.29 MICROALBUMINURIA DUE TO TYPE 2 DIABETES MELLITUS (HCC): ICD-10-CM

## 2022-11-07 DIAGNOSIS — E11.42 DIABETIC POLYNEUROPATHY ASSOCIATED WITH TYPE 2 DIABETES MELLITUS (HCC): ICD-10-CM

## 2022-11-07 DIAGNOSIS — Z99.89 OSA ON CPAP: ICD-10-CM

## 2022-11-07 PROBLEM — Z12.39 BREAST CANCER SCREENING: Status: ACTIVE | Noted: 2021-03-04

## 2022-11-07 PROCEDURE — 3051F HG A1C>EQUAL 7.0%<8.0%: CPT | Performed by: INTERNAL MEDICINE

## 2022-11-07 PROCEDURE — 3078F DIAST BP <80 MM HG: CPT | Performed by: INTERNAL MEDICINE

## 2022-11-07 PROCEDURE — 3074F SYST BP LT 130 MM HG: CPT | Performed by: INTERNAL MEDICINE

## 2022-11-07 PROCEDURE — 99214 OFFICE O/P EST MOD 30 MIN: CPT | Performed by: INTERNAL MEDICINE

## 2022-11-07 ASSESSMENT — ENCOUNTER SYMPTOMS
VOICE CHANGE: 0
RECTAL PAIN: 0
EYE PAIN: 0
STRIDOR: 0

## 2022-11-07 NOTE — PROGRESS NOTES
FOLLOWUP VISIT    Subjective:    Ms. Kathya Rock is a 45 y.o., female,   Chief Complaint   Patient presents with    Follow-up Chronic Condition       HPI:    Patient presents today for follow up of two or more chronic medical problems and review of labs. The patient has diabetes mellitus. The patient denies any symptoms of hypo or hyperglycemia. The patient has been attempting to be compliant with an ADA diet and an exercise program.     The patient has hypothyroidism. The patient denies any symptoms of hypo- or hyperthyroidism on the current dose of levothyroxine. The patient has hyperlipidemia. The patient has been following a low cholesterol diet and denies any myalgias or weakness on current lipid lowering therapy.        The following portions of the patient's history were reviewed and updated as appropriate:      Past Medical History:   Diagnosis Date    Allergic rhinitis     CKD (chronic kidney disease), stage III ([address]     Depression     Diabetes mellitus, type 2 ([address]     Diabetic neuropathy ([address]     Hyperlipidemia associated with type 2 diabetes mellitus ([address]     Hypertension     Hypothyroidism     Obesity     RIDDHI (obstructive sleep apnea)        Past Surgical History:   Procedure Laterality Date    CHOLECYSTECTOMY  1997    TUBAL LIGATION         Family History   Problem Relation Age of Onset    Hypertension Mother     Depression Mother     COPD Mother     Cancer Brother         skin cancer    Heart Failure Brother     Cancer Father         skin     Breast Cancer Neg Hx     Alzheimer's Disease Maternal Grandmother     Heart Disease Brother     Heart Failure Father     COPD Father     Heart Failure Paternal Grandfather     Diabetes Paternal Grandfather     Emphysema Maternal Grandfather        Social History     Socioeconomic History    Marital status:      Spouse name: Not on file    Number of children: Not on file    Years of education: Not on file    Highest education level: Not on file Occupational History    Not on file   Tobacco Use    Smoking status: Never    Smokeless tobacco: Never   Substance and Sexual Activity    Alcohol use: No    Drug use: No    Sexual activity: Not on file   Other Topics Concern    Not on file   Social History Narrative    GYN: 4/5/2022  Abnormal Pap Smears: no  Cervical Procedures: no  Menarche: ~48     Social Determinants of Health     Financial Resource Strain: Not on file   Food Insecurity: Not on file   Transportation Needs: Not on file   Physical Activity: Not on file   Stress: Not on file   Social Connections: Not on file   Intimate Partner Violence: Not on file   Housing Stability: Not on file       Current Outpatient Medications   Medication Sig Dispense Refill    Continuous Glucose Monitor Sup KIT Use as directed 1 kit 1    Continuous Glucose Monitor Sup MISC Use as directed 2 each 5    gabapentin (NEURONTIN) 100 MG capsule TAKE 1 CAPSULE BY MOUTH 3 TIMES DAILY 270 capsule 1    BD PEN NEEDLE NASIR 2ND GEN 32G X 4 MM MISC USE AS DIRECTED      tiZANidine (ZANAFLEX) 2 MG tablet Take 1 tablet by mouth 3 times daily as needed (muscle spasm) 30 tablet 0    insulin aspart (NOVOLOG FLEXPEN) 100 UNIT/ML injection pen 19 Units by SubCUTAneous route Before breakfast, lunch, and dinner 5 pen 2    sertraline (ZOLOFT) 25 MG tablet Take 1 tablet by mouth in the morning. 90 tablet 1    levothyroxine (SYNTHROID) 112 MCG tablet Take 1 tablet by mouth every morning (before breakfast) 90 tablet 1    insulin glargine (LANTUS SOLOSTAR) 100 UNIT/ML injection pen Inject 45 Units into the skin nightly INJECT 45 UNITS BY SUBCUTANEOUS ROUTE DAILY. CALL MD IF BLOOD SUGAR IS LESS THAN 70 OR MORE THAN 400INJECT 45 UNITS BY SUBCUTANEOUS ROUTE DAILY.  CALL MD IF BLOOD SUGAR IS LESS THAN 70 OR MORE THAN 400 5 pen 5    atorvastatin (LIPITOR) 80 MG tablet TAKE 1 TABLET BY MOUTH EVERY DAY 30 tablet 5    amLODIPine (NORVASC) 10 MG tablet TAKE 1 TABLET BY MOUTH EVERY DAY 30 tablet 5    metoprolol succinate (TOPROL XL) 50 MG extended release tablet TAKE 1 TABLET BY MOUTH EVERY DAY 30 tablet 5    empagliflozin (JARDIANCE) 10 MG tablet Take 10 mg by mouth daily       No current facility-administered medications for this visit. Allergies as of 11/07/2022    (No Known Allergies)       Review of Systems   Constitutional:  Negative for activity change and appetite change. HENT:  Negative for drooling and voice change. Eyes:  Negative for pain. Respiratory:  Negative for stridor. Gastrointestinal:  Negative for rectal pain. Endocrine: Negative for polydipsia and polyphagia. Genitourinary:  Negative for dyspareunia and enuresis. Musculoskeletal:  Negative for gait problem and neck stiffness. Skin:  Negative for pallor. Neurological:  Negative for facial asymmetry and speech difficulty. Hematological:  Does not bruise/bleed easily. Psychiatric/Behavioral:  Negative for self-injury. The patient is not hyperactive. Patient Care Team:  Nena Thomas MD as PCP - General  Nena Thomas MD as PCP - [facility] Empaneled Provider    Objective:    /69 (Site: Left Upper Arm, Position: Sitting)   Pulse 74   Temp 98.5 °F (36.9 °C) (Temporal)   Ht 5' 7\" (1.702 m)   Wt 210 lb (95.3 kg)   SpO2 99%   BMI 32.89 kg/m²     Physical Exam  Vitals reviewed. Constitutional:       General: She is not in acute distress. Appearance: She is not toxic-appearing. HENT:      Head: Normocephalic and atraumatic. Right Ear: Tympanic membrane, ear canal and external ear normal.      Left Ear: Tympanic membrane, ear canal and external ear normal.      Nose: Nose normal.      Mouth/Throat:      Mouth: Mucous membranes are moist.      Pharynx: Oropharynx is clear. Eyes:      General: No scleral icterus. Extraocular Movements: Extraocular movements intact. Pupils: Pupils are equal, round, and reactive to light. Cardiovascular:      Rate and Rhythm: Normal rate and regular rhythm. Pulses: Normal pulses. Heart sounds: Normal heart sounds. Pulmonary:      Effort: Pulmonary effort is normal. No respiratory distress. Breath sounds: Normal breath sounds. No stridor. Abdominal:      General: Abdomen is flat. Bowel sounds are normal.      Palpations: Abdomen is soft. There is no mass. Tenderness: There is no guarding or rebound. Musculoskeletal:         General: Normal range of motion. Cervical back: Normal range of motion and neck supple. Skin:     General: Skin is warm and dry. Coloration: Skin is not jaundiced. Neurological:      Mental Status: She is alert and oriented to person, place, and time. Mental status is at baseline. Psychiatric:         Behavior: Behavior normal.         Thought Content:  Thought content normal.            Orders Only on 11/03/2022   Component Date Value Ref Range Status    Hemoglobin A1C 11/03/2022 7.7 (A)  4.8 - 5.6 % Final    eAG 11/03/2022 174  mg/dL Final    Comment: Reference Range  Normal: 4.8-5.6  Diabetic >=6.5%  Normal       <5.7%      Cholesterol, Total 11/03/2022 181  <200 MG/DL Final    Comment: Borderline High: 200-239 mg/dL  High: Greater than or equal to 240 mg/dL      Triglycerides 11/03/2022 108  35 - 150 MG/DL Final    Comment: Borderline High: 150-199 mg/dL, High: 200-499 mg/dL  Very High: Greater than or equal to 500 mg/dL      HDL 11/03/2022 65 (A)  40 - 60 MG/DL Final    LDL Calculated 11/03/2022 94.4  <100 MG/DL Final    Comment: Near Optimal: 100-129 mg/dL  Borderline High: 130-159, High: 160-189 mg/dL  Very High: Greater than or equal to 190 mg/dL      VLDL Cholesterol Calculated 11/03/2022 21.6  6.0 - 23.0 MG/DL Final    Chol/HDL Ratio 11/03/2022 2.8    Final    Sodium 11/03/2022 141  133 - 143 mmol/L Final    Potassium 11/03/2022 4.8  3.5 - 5.1 mmol/L Final    Chloride 11/03/2022 108  101 - 110 mmol/L Final    CO2 11/03/2022 29  21 - 32 mmol/L Final    Anion Gap 11/03/2022 4  2 - 11 mmol/L Final    Glucose 11/03/2022 118 (A)  65 - 100 mg/dL Final    BUN 11/03/2022 37 (A)  6 - 23 MG/DL Final    Creatinine 11/03/2022 1.80 (A)  0.6 - 1.0 MG/DL Final    Est, Glom Filt Rate 11/03/2022 33 (A)  >60 ml/min/1.73m2 Final    Comment:   Pediatric calculator link: Mg.at. org/professionals/kdoqi/gfr_calculatorped    Effective Oct 3, 2022    These results are not intended for use in patients <25years of age. eGFR results are calculated without a race factor using  the 2021 CKD-EPI equation. Careful clinical correlation is recommended, particularly when comparing to results calculated using previous equations. The CKD-EPI equation is less accurate in patients with extremes of muscle mass, extra-renal metabolism of creatinine, excessive creatine ingestion, or following therapy that affects renal tubular secretion. Calcium 11/03/2022 9.2  8.3 - 10.4 MG/DL Final    Total Bilirubin 11/03/2022 0.3  0.2 - 1.1 MG/DL Final    ALT 11/03/2022 20  12 - 65 U/L Final    AST 11/03/2022 11 (A)  15 - 37 U/L Final    Alk Phosphatase 11/03/2022 97  50 - 136 U/L Final    Total Protein 11/03/2022 7.4  6.3 - 8.2 g/dL Final    Albumin 11/03/2022 3.7  3.5 - 5.0 g/dL Final    Globulin 11/03/2022 3.7  2.8 - 4.5 g/dL Final    Albumin/Globulin Ratio 11/03/2022 1.0  0.4 - 1.6   Final         Assessent & Plan:        1. Hypertension, essential  Overview:  Well controlled on current amlodipine and metoprolol. Lisinopril was stopped while hospitalized with acute renal failure / septic shock. 2. Cervical cancer screening  Overview:  4/5/22 Pap / HPV co-test normal.  Dr. Jeimy Smith.    9/22/17 Pap smear normal (Dr. Jeronimo Madsen). 3. Abnormal LFTs  Overview:  11/3/22 LFTs normal.    3/12/20 abdominal US - fatty liver    Labs were reviewed and discussed with patient.       4. Chronic anemia  Overview:  3/2/22 CBC normal.  12/22/21 hgb 10.9  10/23/21 B12 575, folate 14, ferritin 449, iron saturation > 50    The patient has had chronic anemia most likely due to chronic disease and/or chronic kidney disease. Defer management of anemia due to kidney disease to nephrology. 5. Gastroesophageal reflux disease, unspecified whether esophagitis present  Overview:  Controlled with nonpharmacologic therapy. Previously treated with omeprazole. 6. Microalbuminuria due to type 2 diabetes mellitus ([facility]  Overview:  Lisinopril 20 mg daily was discontinued during October 2021 hospitalization for septic shock with lactic acidosis and acute kidney failure requiring dialysis. Continue current antihypertensive and diabetes care. 7. CKD (chronic kidney disease) stage 4, GFR 15-29 ml/min ([facility])  Overview:  11/3/22 creatinine 1.80, eGFR 33  7/11/22 creatinine 1.9, eGFR 29  3/2/22 creatinine 1.94, eGFR 30, urine microalbumin 61  10/27/21 creatinine 2.32, eGFR 23  4/30/21 creatinine 1.46, eGFR 47, urine microalbumin 438.    3/12/20 abdominal US - normal kidneys. The patient had chronic stage IIIb / IV CKD with microalbuminuria. During a October 2021 hospitalization for septic shock with acute renal failure and lactic acidosis requiring temporary hemodialysis her lisinopril 20 mg daily was discontinued. Her renal function has partially recovered. She will no longer receive Metformin. She will follow up with [facility] nephrology as scheduled. If her renal function recovers sufficiently she might at some later date be resumed on ACE inhibitor or ARB therapy due to the microalbuminuria. 8. RIDDHI on CPAP  Overview:  Recommended compliance with CPAP. 9. Hypothyroidism (acquired)  Overview:  7/11/22 TSH 1.680 on levothyroxine 112 mcg daily. Labs were reviewed and discussed with the patient. The patient will continue the current treatment. 10. Hyperlipidemia associated with type 2 diabetes mellitus ([facility]  Overview:  11/3/22 total 181 HDL 65 LDL 94  on atorvastatin 80 mg daily.   Reviewed the most recent lipid panel with the patient, and interpreted the results within the context of the patient's personal cardiovascular risk factors. Discussed/reinforced a low-cholesterol diet. The patient will continue current intensity statin therapy. 11. Depression, major, recurrent, moderate (HCC)  Overview:  Symptoms controlled on sertraline. The patient will continue the current treatment. 12. Type 2 diabetes mellitus with stage 3b chronic kidney disease, with long-term current use of insulin (HCC)  Overview:  11/3/22 A1C 7.7 on Lantus 45 U daily + Humalog 19 U TIDWM + Jardiance 10 mg daily. 3/2/22 A1C 6.6, urine microalbumin 61 on Basaglar 45 U daily + Humalog 19 U TID WM   7/28/21 , A1C 10.2 (on Rybelsus 7 mg and Basaglar 69 units daily and metformin 1000 bid)  4/30/21 , A1C 9.7, urine microalbumin 438. The patient has had historically poorly controlled type II insulin requiring diabetes mellitus complicated by diabetic neuropathy and nephropathy. Rybelsus and Metformin were discontinued during October 2021 hospitalization for septic shock with acute kidney failure and lactic acidosis requiring temporary hemodialysis. Jardiance added due to CKD. Continue Lantus 45 units as her fasting am sugars are good. Discussed self titration of mealtime Humalog to optimize post prandials which have been elevated. She has difficulty with frequent finger sticks. She would be ideal for a CGM. Orders:  -     Continuous Glucose Monitor Sup KIT; Use as directed, Disp-1 kit, R-1Normal  -     Continuous Glucose Monitor Sup MISC; Disp-2 each, R-5, NormalUse as directed  -     Basic Metabolic Panel; Future  -     Hemoglobin A1C; Future  -     Microalbumin / Creatinine Urine Ratio; Future  13. Diabetic polyneuropathy associated with type 2 diabetes mellitus ([facility]  Overview:  Symptoms relieved by gabapentin. The patient will continue the current treatment. Routine diabetic foot care.       14. Encounter for screening mammogram for malignant neoplasm of breast  Overview:  Patient given order for screening mammogram but thus far has chosen to not have a mammogram performed. Recommended annual mammograms. 15. Colon cancer screening  Overview:  Discussed colon cancer screening. Given her multiple medical problems, stage 3 bordering on stage 4 CKD, etc.. decided to perform the less invasive Cologuard. 3/17/22 cologuard negative. 16. Encounter for immunization  Overview:  Vaccinations discussed. She declines Covid vaccination. Recommended Shingrix and flu vaccination. The patient and/or patient representative voiced understanding and agreement with the current diagnoses, recommendations, and possible side effects. Return in about 3 months (around 2/7/2023) for follow up of chronic medical problems, review labs.

## 2022-11-09 DIAGNOSIS — E11.22 TYPE 2 DIABETES MELLITUS WITH STAGE 3B CHRONIC KIDNEY DISEASE, WITH LONG-TERM CURRENT USE OF INSULIN (HCC): Primary | ICD-10-CM

## 2022-11-09 DIAGNOSIS — N18.32 TYPE 2 DIABETES MELLITUS WITH STAGE 3B CHRONIC KIDNEY DISEASE, WITH LONG-TERM CURRENT USE OF INSULIN (HCC): Primary | ICD-10-CM

## 2022-11-09 DIAGNOSIS — Z79.4 TYPE 2 DIABETES MELLITUS WITH STAGE 3B CHRONIC KIDNEY DISEASE, WITH LONG-TERM CURRENT USE OF INSULIN (HCC): Primary | ICD-10-CM

## 2022-11-09 RX ORDER — FLASH GLUCOSE SENSOR
1 KIT MISCELLANEOUS SEE ADMIN INSTRUCTIONS
Qty: 1 EACH | Refills: 5 | Status: SHIPPED | OUTPATIENT
Start: 2022-11-09

## 2022-11-09 RX ORDER — FLASH GLUCOSE SENSOR
3 KIT MISCELLANEOUS SEE ADMIN INSTRUCTIONS
COMMUNITY
End: 2022-11-09 | Stop reason: SDUPTHER

## 2022-11-09 RX ORDER — FLASH GLUCOSE SENSOR
3 KIT MISCELLANEOUS SEE ADMIN INSTRUCTIONS
Qty: 3 EACH | Refills: 5 | Status: SHIPPED | OUTPATIENT
Start: 2022-11-09

## 2022-11-09 NOTE — TELEPHONE ENCOUNTER
[facility] sent a fax stating that they need a specific Glucose Monitoring System. Requested Prescriptions     Pending Prescriptions Disp Refills    Continuous Blood Gluc  (FREESTYLE TIFFANIE READER) JON 1 each 5     Si each by Does not apply route See Admin Instructions Pt to test ac each meal and bedtime. DX:   E11.22    Continuous Blood Gluc Sensor (FREESTYLE TIFFAINE SENSOR SYSTEM) MISC 3 each 5     Sig: 3 each by Does not apply route See Admin Instructions Check BS ac each meal and at bedtime.      DX:  E11.22

## 2022-11-21 RX ORDER — LEVOTHYROXINE SODIUM 112 UG/1
TABLET ORAL
Qty: 90 TABLET | Refills: 1 | Status: SHIPPED | OUTPATIENT
Start: 2022-11-21

## 2022-11-22 DIAGNOSIS — M54.41 ACUTE RIGHT-SIDED LOW BACK PAIN WITH RIGHT-SIDED SCIATICA: ICD-10-CM

## 2022-11-22 RX ORDER — METHYLPREDNISOLONE 4 MG/1
TABLET ORAL
OUTPATIENT
Start: 2022-11-22

## 2022-11-22 RX ORDER — INSULIN ASPART 100 [IU]/ML
INJECTION, SOLUTION INTRAVENOUS; SUBCUTANEOUS
Qty: 15 ADJUSTABLE DOSE PRE-FILLED PEN SYRINGE | Refills: 2 | Status: SHIPPED | OUTPATIENT
Start: 2022-11-22

## 2022-11-22 RX ORDER — TIZANIDINE 2 MG/1
2 TABLET ORAL 3 TIMES DAILY PRN
Qty: 30 TABLET | Refills: 3 | Status: SHIPPED | OUTPATIENT
Start: 2022-11-22

## 2022-11-22 NOTE — TELEPHONE ENCOUNTER
Requested Prescriptions     Pending Prescriptions Disp Refills    tiZANidine (ZANAFLEX) 2 MG tablet [Pharmacy Med Name: TIZANIDINE HCL 2 MG TABLET] 30 tablet 3     Sig: TAKE 1 TABLET BY MOUTH 3 TIMES DAILY AS NEEDED (MUSCLE SPASM). methylPREDNISolone (MEDROL DOSEPACK) 4 MG tablet [Pharmacy Med Name: METHYLPREDNISOLONE 4 MG DOSEPK]       Sig: TAKE 6 TABLETS ON DAY 1 AS DIRECTED ON PACKAGE AND DECREASE BY 1 TAB EACH DAY FOR A TOTAL OF 6 DAYS    insulin aspart (NOVOLOG FLEXPEN) 100 UNIT/ML injection pen [Pharmacy Med Name: Raj Rooney 100 UNIT/ML FLEXPEN] 15 Adjustable Dose Pre-filled Pen Syringe 2     Sig: INJECT 19 UNITS BY SUBCUTANEOUS ROUTE BEFORE BREAKFAST, LUNCH, AND DINNER       She does not need MDP. This was automatic request placed by pharmacy.   Unable to refuse

## 2022-12-07 PROBLEM — Z12.4 CERVICAL CANCER SCREENING: Status: RESOLVED | Noted: 2021-03-04 | Resolved: 2022-12-07

## 2022-12-07 PROBLEM — Z12.11 COLON CANCER SCREENING: Status: RESOLVED | Noted: 2021-03-04 | Resolved: 2022-12-07

## 2022-12-07 PROBLEM — Z12.39 BREAST CANCER SCREENING: Status: RESOLVED | Noted: 2021-03-04 | Resolved: 2022-12-07

## 2023-01-03 RX ORDER — INSULIN GLARGINE 100 [IU]/ML
INJECTION, SOLUTION SUBCUTANEOUS
Qty: 15 ML | Refills: 3 | Status: SHIPPED | OUTPATIENT
Start: 2023-01-03

## 2023-01-22 DIAGNOSIS — F33.1 DEPRESSION, MAJOR, RECURRENT, MODERATE (HCC): ICD-10-CM

## 2023-01-23 RX ORDER — SERTRALINE HYDROCHLORIDE 25 MG/1
TABLET, FILM COATED ORAL
Qty: 90 TABLET | Refills: 1 | Status: SHIPPED | OUTPATIENT
Start: 2023-01-23

## 2023-01-23 NOTE — TELEPHONE ENCOUNTER
Requested Prescriptions     Pending Prescriptions Disp Refills    sertraline (ZOLOFT) 25 MG tablet [Pharmacy Med Name: SERTRALINE HCL 25 MG TABLET] 90 tablet 1     Sig: TAKE 1 TABLET BY MOUTH EVERY DAY IN THE MORNING     Dose verified and to [facility]. Patient is scheduled for follow up visit.

## 2023-02-07 DIAGNOSIS — M54.41 ACUTE RIGHT-SIDED LOW BACK PAIN WITH RIGHT-SIDED SCIATICA: ICD-10-CM

## 2023-02-07 RX ORDER — INSULIN ASPART 100 [IU]/ML
INJECTION, SOLUTION INTRAVENOUS; SUBCUTANEOUS
Refills: 2 | OUTPATIENT
Start: 2023-02-07

## 2023-02-07 RX ORDER — TIZANIDINE 2 MG/1
2 TABLET ORAL 3 TIMES DAILY PRN
Qty: 30 TABLET | Refills: 3 | OUTPATIENT
Start: 2023-02-07

## 2023-06-17 DIAGNOSIS — F33.1 DEPRESSION, MAJOR, RECURRENT, MODERATE (HCC): ICD-10-CM

## 2023-06-19 RX ORDER — SERTRALINE HYDROCHLORIDE 25 MG/1
TABLET, FILM COATED ORAL
Qty: 30 TABLET | Refills: 5 | OUTPATIENT
Start: 2023-06-19

## 2023-06-19 RX ORDER — AMLODIPINE BESYLATE 10 MG/1
TABLET ORAL
Qty: 90 TABLET | Refills: 1 | OUTPATIENT
Start: 2023-06-19

## 2023-07-13 DIAGNOSIS — F33.1 DEPRESSION, MAJOR, RECURRENT, MODERATE (HCC): ICD-10-CM

## 2023-07-13 RX ORDER — SERTRALINE HYDROCHLORIDE 25 MG/1
TABLET, FILM COATED ORAL
Qty: 30 TABLET | Refills: 5 | OUTPATIENT
Start: 2023-07-13